# Patient Record
Sex: FEMALE | Race: WHITE | Employment: UNEMPLOYED | URBAN - NONMETROPOLITAN AREA
[De-identification: names, ages, dates, MRNs, and addresses within clinical notes are randomized per-mention and may not be internally consistent; named-entity substitution may affect disease eponyms.]

---

## 2015-09-11 LAB
CHOLESTEROL, TOTAL: 149 MG/DL
CHOLESTEROL/HDL RATIO: 2.3
GLUCOSE BLD-MCNC: 84 MG/DL
HDLC SERPL-MCNC: 64 MG/DL (ref 35–70)
LDL CHOLESTEROL CALCULATED: 72.2 MG/DL (ref 0–160)
TRIGL SERPL-MCNC: 64 MG/DL
VLDLC SERPL CALC-MCNC: 13 MG/DL

## 2017-05-18 VITALS
SYSTOLIC BLOOD PRESSURE: 132 MMHG | HEIGHT: 65 IN | DIASTOLIC BLOOD PRESSURE: 80 MMHG | TEMPERATURE: 98.5 F | HEART RATE: 74 BPM

## 2017-05-18 RX ORDER — AZITHROMYCIN 250 MG/1
250 TABLET, FILM COATED ORAL DAILY
COMMUNITY
End: 2017-06-02

## 2017-05-18 RX ORDER — PREDNISONE 20 MG/1
20 TABLET ORAL DAILY
COMMUNITY
End: 2017-06-02

## 2017-06-02 ENCOUNTER — OFFICE VISIT (OUTPATIENT)
Dept: FAMILY MEDICINE CLINIC | Age: 47
End: 2017-06-02
Payer: COMMERCIAL

## 2017-06-02 VITALS
HEIGHT: 65 IN | SYSTOLIC BLOOD PRESSURE: 130 MMHG | WEIGHT: 166 LBS | DIASTOLIC BLOOD PRESSURE: 82 MMHG | HEART RATE: 72 BPM | BODY MASS INDEX: 27.66 KG/M2

## 2017-06-02 DIAGNOSIS — O24.410 DIET CONTROLLED GESTATIONAL DIABETES MELLITUS (GDM) IN THIRD TRIMESTER: ICD-10-CM

## 2017-06-02 DIAGNOSIS — Z13.1 SCREENING FOR DIABETES MELLITUS: ICD-10-CM

## 2017-06-02 DIAGNOSIS — Z00.00 WELL ADULT EXAM: Primary | ICD-10-CM

## 2017-06-02 PROCEDURE — 99396 PREV VISIT EST AGE 40-64: CPT | Performed by: FAMILY MEDICINE

## 2017-06-02 ASSESSMENT — PATIENT HEALTH QUESTIONNAIRE - PHQ9
1. LITTLE INTEREST OR PLEASURE IN DOING THINGS: 0
SUM OF ALL RESPONSES TO PHQ QUESTIONS 1-9: 0
SUM OF ALL RESPONSES TO PHQ9 QUESTIONS 1 & 2: 0
2. FEELING DOWN, DEPRESSED OR HOPELESS: 0

## 2017-06-02 ASSESSMENT — ENCOUNTER SYMPTOMS: WHEEZING: 0

## 2017-06-08 LAB — PAP SMEAR: NORMAL

## 2018-02-09 ENCOUNTER — OFFICE VISIT (OUTPATIENT)
Dept: FAMILY MEDICINE CLINIC | Age: 48
End: 2018-02-09
Payer: COMMERCIAL

## 2018-02-09 VITALS
DIASTOLIC BLOOD PRESSURE: 84 MMHG | SYSTOLIC BLOOD PRESSURE: 134 MMHG | TEMPERATURE: 99.7 F | OXYGEN SATURATION: 97 % | HEART RATE: 95 BPM | WEIGHT: 172.7 LBS | BODY MASS INDEX: 28.74 KG/M2
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
## 2018-02-09 DIAGNOSIS — S30.0XXA SACRAL CONTUSION, INITIAL ENCOUNTER: ICD-10-CM

## 2018-02-09 DIAGNOSIS — J10.1 INFLUENZA A: Primary | ICD-10-CM
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
## 2018-02-09 LAB
INFLUENZA A ANTIBODY: POSITIVE
INFLUENZA B ANTIBODY: NEGATIVE

## 2018-02-09 PROCEDURE — G8484 FLU IMMUNIZE NO ADMIN: HCPCS | Performed by: FAMILY MEDICINE

## 2018-02-09 PROCEDURE — 1036F TOBACCO NON-USER: CPT | Performed by: FAMILY MEDICINE

## 2018-02-09 PROCEDURE — G8427 DOCREV CUR MEDS BY ELIG CLIN: HCPCS | Performed by: FAMILY MEDICINE

## 2018-02-09 PROCEDURE — 99214 OFFICE O/P EST MOD 30 MIN: CPT | Performed by: FAMILY MEDICINE

## 2018-02-09 PROCEDURE — 87804 INFLUENZA ASSAY W/OPTIC: CPT | Performed by: FAMILY MEDICINE

## 2018-02-09 PROCEDURE — G8419 CALC BMI OUT NRM PARAM NOF/U: HCPCS | Performed by: FAMILY MEDICINE

## 2018-02-09 ASSESSMENT — ENCOUNTER SYMPTOMS
RHINORRHEA: 1
SORE THROAT: 1
WHEEZING: 0
COUGH: 1

## 2018-02-09 NOTE — PROGRESS NOTES
1200 [facility]  [address]  Dept: [phone]  Dept Fax: [phone]    Diaz Hannah is a 49 y.o. female who presents today for her medical conditions/complaints as noted below. Diaz Hannah is c/o of Cough (stated symptoms started a week ago, hit really hard wednesday. Has taken ibuprofen pm and tylenol mucous and denies any complications but its not working anymore ); Congestion (cough, sneezing, fever started wednesday morning, headache, ear pain in both ears, denies any nasal drainage, feels like upper airway congestion, throat started hurting yesterda hurts to swallow); and Other (fell down the stairs on Monday tailbone really hurts, states has been down hill since feels like compa been hit by a truck.)      HPI:     Started not feeling well about 1 week ago- got chilled when she was outside but really went down hilll on Weds. Fro[place] down the stairs on her Tailbone. Slipped on water as she was washing the steps. Landed squarely on her tailbone. Hurts to sit. Pain in her right anterior thigh, no bruising. No other injury. No weakness, no numbness or tingling noted. Sons had similar sx but they were better in a day or 2. Cough   The current episode started in the past 7 days (ilness actually really started on weds). The problem occurs every few minutes. The cough is productive of sputum. Associated symptoms include chills, a fever, headaches, myalgias (really bad everywhere), nasal congestion, rhinorrhea (minimal), a sore throat and sweats. Pertinent negatives include no chest pain, postnasal drip or wheezing. The symptoms are aggravated by lying down. Other   Associated symptoms include chills, coughing, a fever, headaches, myalgias (really bad everywhere) and a sore throat. Pertinent negatives include no chest pain.        BP Readings from Last 3 Encounters:   02/09/18 134/84   06/02/17 130/82   06/27/16 132/80          (goal 120/80)    Wt Readings from Last 3 Encounters:   18 172 lb 11.2 oz (78.3 kg)   17 166 lb (75.3 kg)        Past Medical History:   Diagnosis Date    Asthma     Gestational diabetes       Past Surgical History:   Procedure Laterality Date     SECTION      x 3       Family History   Problem Relation Age of Onset    Arthritis Mother      spinal stenosis    Cirrhosis Father     Arthritis Father     Asthma Maternal Grandmother     Cancer Paternal Grandmother      stomach       Social History   Substance Use Topics    Smoking status: Never Smoker    Smokeless tobacco: Never Used    Alcohol use No      No current outpatient prescriptions on file. No current facility-administered medications for this visit. Allergies   Allergen Reactions    Ibuprofen      Hives, hand swelling    Penicillins     Tylenol [Acetaminophen]      Ear ringing       Health Maintenance   Topic Date Due    HIV screen  1984    DTaP/Tdap/Td vaccine (1 - Tdap) 1988    Flu vaccine (1) 2017    Cervical cancer screen  2020    Lipid screen  2020       Subjective:      Review of Systems   Constitutional: Positive for chills and fever. HENT: Positive for rhinorrhea (minimal) and sore throat. Negative for postnasal drip. Respiratory: Positive for cough. Negative for wheezing. Cardiovascular: Negative for chest pain. Musculoskeletal: Positive for myalgias (really bad everywhere). Neurological: Positive for headaches. Objective:     /84   Pulse 95   Temp 99.7 °F (37.6 °C)   Wt 172 lb 11.2 oz (78.3 kg)   SpO2 97%   BMI 28.74 kg/m²     Physical Exam   Constitutional: She is oriented to person, place, and time. She appears well-developed and well-nourished. No distress. HENT:   Head: Normocephalic and atraumatic.    Right Ear: Tympanic membrane and external ear normal.   Left Ear: Tympanic membrane and external ear normal.   Mouth/Throat: Oropharynx is clear

## 2018-02-12 ENCOUNTER — OFFICE VISIT (OUTPATIENT)
Dept: FAMILY MEDICINE CLINIC | Age: 48
End: 2018-02-12
Payer: COMMERCIAL

## 2018-02-12 VITALS
WEIGHT: 173.5 LBS | DIASTOLIC BLOOD PRESSURE: 80 MMHG | HEART RATE: 81 BPM | SYSTOLIC BLOOD PRESSURE: 120 MMHG | OXYGEN SATURATION: 98 % | TEMPERATURE: 96.5 F | BODY MASS INDEX: 28.87 KG/M2

## 2018-02-12 DIAGNOSIS — J10.1 INFLUENZA A: Primary | ICD-10-CM

## 2018-02-12 DIAGNOSIS — R05.9 COUGH: ICD-10-CM

## 2018-02-12 PROCEDURE — G8419 CALC BMI OUT NRM PARAM NOF/U: HCPCS | Performed by: FAMILY MEDICINE

## 2018-02-12 PROCEDURE — 99214 OFFICE O/P EST MOD 30 MIN: CPT | Performed by: FAMILY MEDICINE

## 2018-02-12 PROCEDURE — G8427 DOCREV CUR MEDS BY ELIG CLIN: HCPCS | Performed by: FAMILY MEDICINE

## 2018-02-12 PROCEDURE — 1036F TOBACCO NON-USER: CPT | Performed by: FAMILY MEDICINE

## 2018-02-12 PROCEDURE — G8484 FLU IMMUNIZE NO ADMIN: HCPCS | Performed by: FAMILY MEDICINE

## 2018-02-12 RX ORDER — BENZONATATE 200 MG/1
200 CAPSULE ORAL 3 TIMES DAILY PRN
Qty: 30 CAPSULE | Refills: 0 | Status: SHIPPED | OUTPATIENT
Start: 2018-02-12 | End: 2019-03-28 | Stop reason: ALTCHOICE

## 2018-02-12 ASSESSMENT — ENCOUNTER SYMPTOMS
COUGH: 1
SHORTNESS OF BREATH: 1

## 2018-02-12 NOTE — PROGRESS NOTES
x 3       Family History   Problem Relation Age of Onset    Arthritis Mother      spinal stenosis    Cirrhosis Father     Arthritis Father     Asthma Maternal Grandmother     Cancer Paternal Grandmother      stomach       Social History   Substance Use Topics    Smoking status: Never Smoker    Smokeless tobacco: Never Used    Alcohol use No      Current Outpatient Prescriptions   Medication Sig Dispense Refill    benzonatate (TESSALON) 200 MG capsule Take 1 capsule by mouth 3 times daily as needed for Cough 30 capsule 0     No current facility-administered medications for this visit. Allergies   Allergen Reactions    Ibuprofen      Hives, hand swelling    Penicillins     Tylenol [Acetaminophen]      Ear ringing       Health Maintenance   Topic Date Due    HIV screen  04/16/1984    DTaP/Tdap/Td vaccine (1 - Tdap) 04/16/1988    Flu vaccine (1) 09/01/2017    Cervical cancer screen  06/02/2020    Lipid screen  09/11/2020       Subjective:      Review of Systems   Constitutional: Positive for activity change, appetite change, chills, fatigue and fever (not the last day). Negative for unexpected weight change. Respiratory: Positive for cough and shortness of breath. Cardiovascular: Negative for chest pain, palpitations and leg swelling. Objective:     /80   Pulse 81   Temp 96.5 °F (35.8 °C)   Wt 173 lb 8 oz (78.7 kg)   SpO2 98%   BMI 28.87 kg/m²     Physical Exam   Constitutional: She is oriented to person, place, and time. She appears well-developed and well-nourished. No distress. HENT:   Head: Normocephalic and atraumatic. Right Ear: Tympanic membrane and external ear normal.   Left Ear: Tympanic membrane and external ear normal.   Mouth/Throat: Oropharynx is clear and moist. No oropharyngeal exudate. Eyes: Conjunctivae and EOM are normal. Pupils are equal, round, and reactive to light. Neck: Normal range of motion. No thyromegaly present.    Cardiovascular: Normal

## 2019-01-30 ENCOUNTER — OFFICE VISIT (OUTPATIENT)
Dept: FAMILY MEDICINE CLINIC | Age: 49
End: 2019-01-30
Payer: COMMERCIAL

## 2019-01-30 VITALS
SYSTOLIC BLOOD PRESSURE: 120 MMHG | RESPIRATION RATE: 16 BRPM | DIASTOLIC BLOOD PRESSURE: 80 MMHG | HEIGHT: 65 IN | HEART RATE: 93 BPM | BODY MASS INDEX: 28.52 KG/M2 | OXYGEN SATURATION: 97 % | TEMPERATURE: 98.2 F | WEIGHT: 171.2 LBS

## 2019-01-30 DIAGNOSIS — M79.602 LEFT ARM PAIN: ICD-10-CM

## 2019-01-30 DIAGNOSIS — M54.2 NECK PAIN ON LEFT SIDE: Primary | ICD-10-CM

## 2019-01-30 PROCEDURE — G8427 DOCREV CUR MEDS BY ELIG CLIN: HCPCS | Performed by: NURSE PRACTITIONER

## 2019-01-30 PROCEDURE — G8484 FLU IMMUNIZE NO ADMIN: HCPCS | Performed by: NURSE PRACTITIONER

## 2019-01-30 PROCEDURE — 1036F TOBACCO NON-USER: CPT | Performed by: NURSE PRACTITIONER

## 2019-01-30 PROCEDURE — G8419 CALC BMI OUT NRM PARAM NOF/U: HCPCS | Performed by: NURSE PRACTITIONER

## 2019-01-30 PROCEDURE — 99213 OFFICE O/P EST LOW 20 MIN: CPT | Performed by: NURSE PRACTITIONER

## 2019-01-30 RX ORDER — CYCLOBENZAPRINE HCL 10 MG
10 TABLET ORAL 3 TIMES DAILY PRN
Qty: 30 TABLET | Refills: 0 | Status: SHIPPED | OUTPATIENT
Start: 2019-01-30 | End: 2019-02-09

## 2019-01-30 RX ORDER — PREDNISONE 20 MG/1
20 TABLET ORAL 2 TIMES DAILY
Qty: 10 TABLET | Refills: 0 | Status: SHIPPED | OUTPATIENT
Start: 2019-01-30 | End: 2019-02-04

## 2019-01-30 ASSESSMENT — ENCOUNTER SYMPTOMS
GASTROINTESTINAL NEGATIVE: 1
ALLERGIC/IMMUNOLOGIC NEGATIVE: 1
RESPIRATORY NEGATIVE: 1
CHEST TIGHTNESS: 0
COUGH: 0
SHORTNESS OF BREATH: 0
EYES NEGATIVE: 1

## 2019-03-28 ENCOUNTER — OFFICE VISIT (OUTPATIENT)
Dept: FAMILY MEDICINE CLINIC | Age: 49
End: 2019-03-28
Payer: COMMERCIAL

## 2019-03-28 VITALS
OXYGEN SATURATION: 98 % | BODY MASS INDEX: 28.9 KG/M2 | DIASTOLIC BLOOD PRESSURE: 70 MMHG | SYSTOLIC BLOOD PRESSURE: 118 MMHG | WEIGHT: 175 LBS | HEART RATE: 76 BPM

## 2019-03-28 DIAGNOSIS — M75.82 ROTATOR CUFF TENDONITIS, LEFT: Primary | ICD-10-CM

## 2019-03-28 PROCEDURE — 1036F TOBACCO NON-USER: CPT | Performed by: FAMILY MEDICINE

## 2019-03-28 PROCEDURE — G8427 DOCREV CUR MEDS BY ELIG CLIN: HCPCS | Performed by: FAMILY MEDICINE

## 2019-03-28 PROCEDURE — G8484 FLU IMMUNIZE NO ADMIN: HCPCS | Performed by: FAMILY MEDICINE

## 2019-03-28 PROCEDURE — 99213 OFFICE O/P EST LOW 20 MIN: CPT | Performed by: FAMILY MEDICINE

## 2019-03-28 PROCEDURE — G8419 CALC BMI OUT NRM PARAM NOF/U: HCPCS | Performed by: FAMILY MEDICINE

## 2019-03-28 RX ORDER — PREDNISONE 20 MG/1
20 TABLET ORAL DAILY
Qty: 7 TABLET | Refills: 0 | Status: SHIPPED | OUTPATIENT
Start: 2019-03-28 | End: 2019-04-04

## 2019-03-28 ASSESSMENT — PATIENT HEALTH QUESTIONNAIRE - PHQ9
1. LITTLE INTEREST OR PLEASURE IN DOING THINGS: 0
SUM OF ALL RESPONSES TO PHQ9 QUESTIONS 1 & 2: 1
SUM OF ALL RESPONSES TO PHQ QUESTIONS 1-9: 1
2. FEELING DOWN, DEPRESSED OR HOPELESS: 1
SUM OF ALL RESPONSES TO PHQ QUESTIONS 1-9: 1

## 2019-03-28 NOTE — PATIENT INSTRUCTIONS
Patient Education        Rotator Cuff Injury: Care Instructions  Your Care Instructions  The rotator cuff is a group of tendons and muscles around the shoulder that keeps the upper arm bone in place. It keeps the shoulder joint stable and allows you to raise and rotate your arm. Damage to the rotator cuff can be caused by overuse, a fall, or a direct blow to the shoulder area, which can tear the tendons. Over time, everyday wear can damage the tendons and make injury more likely. Treatment can depend upon the amount of damage to the tendons. In a younger person, surgery may be the first choice. But if you are older than 25, you likely have some wear on your rotator cuff. Surgery may not be the most effective treatment. Your doctor may have you try physical therapy and exercise first.  Follow-up care is a key part of your treatment and safety. Be sure to make and go to all appointments, and call your doctor if you are having problems. It's also a good idea to know your test results and keep a list of the medicines you take. How can you care for yourself at home? · Rest your shoulder as much as you can. If your doctor put your arm in a sling or shoulder immobilizer, wear it as directed. Do not take it off before your doctor tells you to. If it is too tight, loosen it. · Be safe with medicines. Read and follow all instructions on the label. ? If the doctor gave you a prescription medicine for pain, take it as prescribed. ? If you are not taking a prescription pain medicine, ask your doctor if you can take an over-the-counter medicine. · Put ice or a cold pack on your shoulder for 10 to 20 minutes at a time. Try to do this every 1 to 2 hours for the next 3 days (when you are awake). Put a thin cloth between the ice pack and your skin. · After 3 days, put a warm, wet towel on your shoulder. This is to relax the muscles and help blood flow.   · While holding a warm, wet towel on your shoulder, lean forward so and anti-inflammatory medicine to reduce pain and swelling. If you still have pain after trying these treatments, you and your doctor can discuss having a steroid injection or surgery. Follow-up care is a key part of your treatment and safety. Be sure to make and go to all appointments, and call your doctor if you are having problems. It's also a good idea to know your test results and keep a list of the medicines you take. How can you care for yourself at home? · Be safe with medicines. Read and follow all instructions on the label. ? If the doctor gave you a prescription medicine for pain, take it as prescribed. ? If you are not taking a prescription pain medicine, ask your doctor if you can take an over-the-counter medicine. · Put ice or a cold pack on your shoulder for 10 to 20 minutes at a time. Try to do this every 1 to 2 hours for the next 3 days (when you are awake). Put a thin cloth between the ice pack and your skin. · After 3 days, put a warm, wet towel on your shoulder. This is to relax the muscles and increase blood flow. While holding the towel on your shoulder, lean forward so your arm hangs freely, and gently swing your arm back and forth like a pendulum. You also can do this standing under a warm shower. · Follow your doctor's advice for physical therapy. When your doctor says it is okay, try these stretching exercises. Do them slowly to avoid injury. Put a warm, wet towel on your shoulder before exercising. Stop any exercise that increases pain. ? Range-of-motion exercises. If it is not too painful, stretch your arm in four directions: across the body, up the back, to the side, and overhead. ? Pendulum exercise. Lean forward and hold onto a table or the back of a chair with your good arm. Bend at the waist, letting the arm with the sore shoulder hang straight down. Swing your arm back and forth like a pendulum, then in circles that start small and slowly grow larger.  This exercise does not use the arm muscles. Instead, use your legs and your hips to create movement that makes your arm swing freely. Try this for about 5 minutes, several times a day. ? Wall climbing (to the side). Stand with your side to a wall so that your fingers can just touch it. Then turn so your body is turned slightly toward the wall. Walk the fingers of your injured arm up the wall as high as pain permits. Try not to shrug your shoulder up toward your ear as you move your arm up. Hold that position for a count of 15 to 30 seconds. Walk your fingers down to the starting position. Repeat 2 to 4 times, trying to reach higher each time. ? Wall climbing (to the front). Face a wall, standing so your fingers can just touch it. Walk the fingers of your affected arm up the wall as high as pain permits. Try not to shrug your shoulder up toward your ear as you move your arm up. Hold that position for a count of 15 to 30 seconds. Slowly walk your fingers to the starting position. Repeat 2 to 4 times, trying to reach higher each time. · Rest your shoulder when you are not doing stretches and other exercises. Your doctor may tell you to wait for the pain to go away before doing exercises. Do not lift heavy bags of groceries, play sports, or do anything else that makes you twist or stress your shoulder. Avoid activities where you move your affected arm above your head. When should you call for help? Call your doctor now or seek immediate medical care if:    · You have severe pain.     · You cannot move your shoulder or arm.     · You have tingling or numbness in your arm or hand.     · Your arm or hand is cool or pale.    Watch closely for changes in your health, and be sure to contact your doctor if:    · Your pain gets worse.     · You have new or worse swelling in your arm or hand.     · You do not get better as expected. Where can you learn more? Go to [email or web address] org and sign in to your [employer] account. Enter E207 in the [facility] box to learn more about \"Rotator Cuff Problems: Care Instructions. \"     If you do not have an account, please click on the \"Sign Up Now\" link. Current as of: September 20, 2018  Content Version: 11.9  © 5627-4635 [employer]. Care instructions adapted under license by [facility] ([facility]). If you have questions about a medical condition or this instruction, always ask your healthcare professional. [address] any warranty or liability for your use of this information.

## 2019-03-28 NOTE — PROGRESS NOTES
spinal stenosis    Cirrhosis Father     Arthritis Father     Asthma Maternal Grandmother     Cancer Paternal Grandmother         stomach       Social History     Tobacco Use    Smoking status: Never Smoker    Smokeless tobacco: Never Used   Substance Use Topics    Alcohol use: No      No current outpatient medications on file. No current facility-administered medications for this visit. Allergies   Allergen Reactions    Ibuprofen      Hives, hand swelling    Penicillins     Tylenol [Acetaminophen]      Ear ringing       Health Maintenance   Topic Date Due    HIV screen  04/16/1984    DTaP/Tdap/Td vaccine (1 - Tdap) 04/16/1988    Diabetes screen  04/16/2009    Flu vaccine (Season Ended) 09/01/2019    Cervical cancer screen  06/02/2020    Lipid screen  09/11/2020       Subjective:      Review of Systems   Neurological: Positive for numbness (in the tips of her fingers if she sleeps with her arm flexed). Objective:     /70 (Site: Right Upper Arm, Position: Sitting, Cuff Size: Medium Adult)   Pulse 76   Wt 175 lb (79.4 kg)   SpO2 98%   BMI 28.90 kg/m²     Physical Exam   Constitutional: She is oriented to person, place, and time. She appears well-developed and well-nourished. Cardiovascular: Normal rate. Pulmonary/Chest: Effort normal.   Musculoskeletal: Normal range of motion. She exhibits no edema. Left shoulder has full ROM but has tenderness on the acroium, tender on the deltoid insertion, tender on the lateral scapula. Positive empty can and more so the drop arm, neg Neer, no pain with ext rotation but pain with internal rotation   Neurological: She is alert and oriented to person, place, and time. Nursing note and vitals reviewed. Assessment/Plan:      Diagnosis Orders   1. Rotator cuff tendonitis, left       Suspect acute strain of the rotator cuff. Discussed avoiding activities that over work. Ice, gently ROM reviewed.   If not improving then would consider referral for physical therapy    Lab Results   Component Value Date    CHOL 149 09/11/2015    TRIG 64 09/11/2015    HDL 64 09/11/2015       Return if symptoms worsen or fail to improve. Patient given educational materials - see patientinstructions. Discussed use, benefit, and side effects of prescribed medications. All patient questions answered. Pt voiced understanding. Reviewed health maintenance. Instructed to continue current medications, diet andexercise. Patient agreed with treatment plan. Follow up as directed.      Electronically signed by Safia Malcolm MD on 4/7/2019

## 2021-01-12 ENCOUNTER — OFFICE VISIT (OUTPATIENT)
Dept: FAMILY MEDICINE CLINIC | Age: 51
End: 2021-01-12
Payer: COMMERCIAL

## 2021-01-12 VITALS
WEIGHT: 185.8 LBS | OXYGEN SATURATION: 99 % | BODY MASS INDEX: 30.68 KG/M2 | SYSTOLIC BLOOD PRESSURE: 134 MMHG | HEART RATE: 83 BPM | DIASTOLIC BLOOD PRESSURE: 86 MMHG

## 2021-01-12 DIAGNOSIS — M25.511 CHRONIC PAIN IN RIGHT SHOULDER: Primary | ICD-10-CM

## 2021-01-12 DIAGNOSIS — G89.29 CHRONIC PAIN IN RIGHT SHOULDER: Primary | ICD-10-CM

## 2021-01-12 PROCEDURE — G8484 FLU IMMUNIZE NO ADMIN: HCPCS | Performed by: NURSE PRACTITIONER

## 2021-01-12 PROCEDURE — G8427 DOCREV CUR MEDS BY ELIG CLIN: HCPCS | Performed by: NURSE PRACTITIONER

## 2021-01-12 PROCEDURE — 3017F COLORECTAL CA SCREEN DOC REV: CPT | Performed by: NURSE PRACTITIONER

## 2021-01-12 PROCEDURE — 99213 OFFICE O/P EST LOW 20 MIN: CPT | Performed by: NURSE PRACTITIONER

## 2021-01-12 PROCEDURE — G8417 CALC BMI ABV UP PARAM F/U: HCPCS | Performed by: NURSE PRACTITIONER

## 2021-01-12 PROCEDURE — 1036F TOBACCO NON-USER: CPT | Performed by: NURSE PRACTITIONER

## 2021-01-12 RX ORDER — MELOXICAM 7.5 MG/1
7.5 TABLET ORAL DAILY PRN
Qty: 30 TABLET | Refills: 0 | Status: SHIPPED | OUTPATIENT
Start: 2021-01-12 | End: 2021-01-20 | Stop reason: SDUPTHER

## 2021-01-12 NOTE — PROGRESS NOTES
1200 [facility]  [address] [facility], [address] [facility]  Dept: [phone]  Dept Fax: [phone]    Feliberto Scheuermann is a 45 y.o. female who presents today for her medical conditions/complaints as noted below. Feliberto Scheuermann c/o of Arthritis (c/o upper arm, shoulder pain, has been ongoing for years, started PT today was second visit but says pain in too much, was only at night but now during day, xray showed arthritis, has tried alleve, was given an antiflammatory, tylenol did not help)      HPI:   Right shoulder pain/arm for 5 years, only notices in the winter months. Started 10/2020, worsening over the past 2-3 weeks. She had recent xray of her neck and states she was told it is arthritis. She went to her first physical therapy appointment last Friday and states her pain increased after this. Pain is rated as \"unbearable\". It keeps her awake at night. She has tried Tylenol, \"something else from [place]", and Tylenol PM. Nothing seems to be working. She had a follow up PT visit today and states she does not want to go back. Shoulder Pain   The pain is present in the right shoulder. This is a chronic problem. There has been no history of extremity trauma. The problem occurs daily. The problem has been gradually worsening. The quality of the pain is described as aching. The pain is moderate. Associated symptoms include a limited range of motion. Pertinent negatives include no fever, inability to bear weight, itching, joint locking, joint swelling, numbness or tingling. The symptoms are aggravated by activity. She has tried rest, acetaminophen and NSAIDS for the symptoms. The treatment provided mild relief.        BP Readings from Last 3 Encounters:   01/12/21 134/86   03/28/19 118/70   01/30/19 120/80              ([id number])    Pulse Readings from Last 3 Encounters:   01/12/21 83   03/28/19 76   01/30/19 93        Wt Readings from Last 3 Encounters: 21 185 lb 12.8 oz (84.3 kg)   19 175 lb (79.4 kg)   19 171 lb 3.2 oz (77.7 kg)       Past Medical History:   Diagnosis Date    Asthma     Gestational diabetes       Past Surgical History:   Procedure Laterality Date     SECTION      x 3       Family History   Problem Relation Age of Onset    Arthritis Mother         spinal stenosis    Cirrhosis Father     Arthritis Father     Asthma Maternal Grandmother     Cancer Paternal Grandmother         stomach       Social History     Tobacco Use    Smoking status: Never Smoker    Smokeless tobacco: Never Used   Substance Use Topics    Alcohol use: No      Current Outpatient Medications   Medication Sig Dispense Refill    meloxicam (MOBIC) 7.5 MG tablet Take 1 tablet by mouth daily as needed for Pain 30 tablet 0     No current facility-administered medications for this visit. Allergies   Allergen Reactions    Ibuprofen      Hives, hand swelling    Penicillins     Tylenol [Acetaminophen]      Ear ringing       Health Maintenance   Topic Date Due    Hepatitis C screen  1969    HIV screen  1984    DTaP/Tdap/Td vaccine (1 - Tdap) 1988    Breast cancer screen  2019    Shingles Vaccine (1 of 2) 2019    Colon cancer screen colonoscopy  2019    Cervical cancer screen  2020    Flu vaccine (1) 2020    Lipid screen  2020    Hepatitis A vaccine  Aged Out    Hepatitis B vaccine  Aged Out    Hib vaccine  Aged Out    Meningococcal (ACWY) vaccine  Aged Out    Pneumococcal 0-64 years Vaccine  Aged Out       Subjective:      Review of Systems   Constitutional: Negative for appetite change, chills, fatigue and fever. HENT: Negative. Cardiovascular: Negative for chest pain. Musculoskeletal: Positive for arthralgias (right shoulder). Negative for neck pain. Skin: Negative for itching. Neurological: Negative for tingling and numbness.        Objective: /86   Pulse 83   Wt 185 lb 12.8 oz (84.3 kg)   SpO2 99%   BMI 30.68 kg/m²     Physical Exam  Constitutional:       Appearance: Normal appearance. HENT:      Head: Normocephalic. Eyes:      Conjunctiva/sclera: Conjunctivae normal.   Neck:      Musculoskeletal: Neck supple. Cardiovascular:      Rate and Rhythm: Normal rate and regular rhythm. Heart sounds: Normal heart sounds. Pulmonary:      Effort: Pulmonary effort is normal.      Breath sounds: Normal breath sounds. No wheezing. Musculoskeletal:      Left shoulder: She exhibits decreased range of motion and tenderness. Neurological:      Mental Status: She is alert and oriented to person, place, and time. Right Shoulder Exam     Tenderness   The patient is experiencing tenderness in the acromioclavicular joint. Range of Motion   Active abduction: abnormal   Extension: abnormal   Forward flexion: abnormal   Internal rotation 0 degrees: abnormal     Tests   Apprehension: negative  Impingement: positive            PHQ Scores 3/28/2019 6/2/2017   PHQ2 Score 1 0   PHQ9 Score 1 0     Interpretation of Total Score Depression Severity: 1-4 = Minimal depression, 5-9 = Mild depression, 10-14 = Moderate depression, 15-19 = Moderately severe depression, 20-27 = Severe depression     Lab Results   Component Value Date    GLUCOSE 84 09/11/2015     No results found for: LABA1C    Lab Results   Component Value Date    LDLCALC 72.2 09/11/2015       Assessment:     1.  Chronic pain in right shoulder        Plan:     Orders Placed This Encounter   Procedures    XR SHOULDER RIGHT (MIN 2 VIEWS)       Orders Placed This Encounter   Medications    meloxicam (MOBIC) 7.5 MG tablet     Sig: Take 1 tablet by mouth daily as needed for Pain     Dispense:  30 tablet     Refill:  0 Start meloxicam daily with a meal.  Do not take any other NSAIDs with this medication. Discussed use, benefit, and side effects of prescribed medications. All patient questions answered. Patient voiced understanding. Instructed to continue current medications. Patient agreed with treatment plan. Follow up with Dr. Cele Diamond if not improving. Return if symptoms worsen or fail to improve.     Electronically signed by ARIANNA Lozano CNP on 1/17/2021

## 2021-01-20 ENCOUNTER — HOSPITAL ENCOUNTER (OUTPATIENT)
Age: 51
Setting detail: SPECIMEN
Discharge: HOME | End: 2021-01-20
Payer: COMMERCIAL

## 2021-01-20 ENCOUNTER — OFFICE VISIT (OUTPATIENT)
Dept: FAMILY MEDICINE CLINIC | Age: 51
End: 2021-01-20
Payer: COMMERCIAL

## 2021-01-20 VITALS
BODY MASS INDEX: 31.49 KG/M2 | DIASTOLIC BLOOD PRESSURE: 78 MMHG | SYSTOLIC BLOOD PRESSURE: 138 MMHG | HEART RATE: 67 BPM | HEIGHT: 65 IN | OXYGEN SATURATION: 98 % | WEIGHT: 189 LBS

## 2021-01-20 DIAGNOSIS — Z13.1 ENCOUNTER FOR SCREENING EXAMINATION FOR IMPAIRED GLUCOSE REGULATION AND DIABETES MELLITUS: ICD-10-CM

## 2021-01-20 DIAGNOSIS — G89.29 CHRONIC PAIN IN RIGHT SHOULDER: Primary | ICD-10-CM

## 2021-01-20 DIAGNOSIS — Z13.220 SCREENING, LIPID: ICD-10-CM

## 2021-01-20 DIAGNOSIS — R63.5 WEIGHT GAIN: ICD-10-CM

## 2021-01-20 DIAGNOSIS — Z86.32 HISTORY OF GESTATIONAL DIABETES: ICD-10-CM

## 2021-01-20 DIAGNOSIS — M25.511 CHRONIC PAIN IN RIGHT SHOULDER: Primary | ICD-10-CM

## 2021-01-20 DIAGNOSIS — Z71.89 ADVICE GIVEN ABOUT COVID-19 VIRUS INFECTION: ICD-10-CM

## 2021-01-20 LAB — GLUCOSE FASTING: 98 MG/DL (ref 70–99)

## 2021-01-20 PROCEDURE — 99214 OFFICE O/P EST MOD 30 MIN: CPT | Performed by: FAMILY MEDICINE

## 2021-01-20 PROCEDURE — G8484 FLU IMMUNIZE NO ADMIN: HCPCS | Performed by: FAMILY MEDICINE

## 2021-01-20 PROCEDURE — 82947 ASSAY GLUCOSE BLOOD QUANT: CPT

## 2021-01-20 PROCEDURE — G8417 CALC BMI ABV UP PARAM F/U: HCPCS | Performed by: FAMILY MEDICINE

## 2021-01-20 PROCEDURE — 36415 COLL VENOUS BLD VENIPUNCTURE: CPT

## 2021-01-20 PROCEDURE — G8427 DOCREV CUR MEDS BY ELIG CLIN: HCPCS | Performed by: FAMILY MEDICINE

## 2021-01-20 PROCEDURE — 80061 LIPID PANEL: CPT

## 2021-01-20 PROCEDURE — 1036F TOBACCO NON-USER: CPT | Performed by: FAMILY MEDICINE

## 2021-01-20 PROCEDURE — 3017F COLORECTAL CA SCREEN DOC REV: CPT | Performed by: FAMILY MEDICINE

## 2021-01-20 RX ORDER — ACETAMINOPHEN 500 MG
500 TABLET ORAL EVERY 6 HOURS PRN
COMMUNITY

## 2021-01-20 RX ORDER — MELOXICAM 7.5 MG/1
7.5 TABLET ORAL DAILY PRN
Qty: 30 TABLET | Refills: 3 | Status: SHIPPED | OUTPATIENT
Start: 2021-01-20

## 2021-01-20 SDOH — ECONOMIC STABILITY: INCOME INSECURITY: HOW HARD IS IT FOR YOU TO PAY FOR THE VERY BASICS LIKE FOOD, HOUSING, MEDICAL CARE, AND HEATING?: NOT ASKED

## 2021-01-20 SDOH — ECONOMIC STABILITY: FOOD INSECURITY: WITHIN THE PAST 12 MONTHS, THE FOOD YOU BOUGHT JUST DIDN'T LAST AND YOU DIDN'T HAVE MONEY TO GET MORE.: NEVER TRUE

## 2021-01-20 SDOH — ECONOMIC STABILITY: TRANSPORTATION INSECURITY
IN THE PAST 12 MONTHS, HAS THE LACK OF TRANSPORTATION KEPT YOU FROM MEDICAL APPOINTMENTS OR FROM GETTING MEDICATIONS?: NO

## 2021-01-20 ASSESSMENT — PATIENT HEALTH QUESTIONNAIRE - PHQ9
SUM OF ALL RESPONSES TO PHQ QUESTIONS 1-9: 0
SUM OF ALL RESPONSES TO PHQ QUESTIONS 1-9: 0
1. LITTLE INTEREST OR PLEASURE IN DOING THINGS: 0

## 2021-01-20 NOTE — PROGRESS NOTES
[facility]  [address]  Dept: [phone]  Dept Z:[phone]    Ori Ellington is a 45 y.o. female who presents today for her medical conditions/complaints as notedbelow. Ori Ellington is c/o of Arthritis (follow up shoulder pain- meloxicam is helping )      HPI:     RAVI    Has had the right shoulder pain chronically for about 5 years. Usually is worst in the winter. Earlier in December was not even able to lat on the right side. Did go to physical therapy for her neck and mild arthritis. Also had increased pain in her right shoulder and tried the prednisone which did not help much but the shoulder. Did start on the meloxicam and this does help but took a while to kick in. Now feels much better overall, only a bit achey in th morning and then will be fine    Worried about her weight gain. Prior to Covid she did very well and actually get down to about 140 pounds watching her carbohydrates. She is continue to stay very physically active and did not think she changed her diet since that time but her weight has come back on. She is not really watching her calories at all and in the past when she did watch allergies she was taking to an 1800 kcal diet    Also has questions about the COVID-19 vaccine and she is a bit concerned about this.     BP Readings from Last 3 Encounters:   21 138/78   21 134/86   19 118/70          (goal 120/80)    Wt Readings from Last 3 Encounters:   21 189 lb (85.7 kg)   21 185 lb 12.8 oz (84.3 kg)   19 175 lb (79.4 kg)        Past Medical History:   Diagnosis Date    Asthma     Gestational diabetes       Past Surgical History:   Procedure Laterality Date     SECTION      x 3       Family History   Problem Relation Age of Onset    Arthritis Mother         spinal stenosis    Cirrhosis Father     Arthritis Father     Asthma Maternal Grandmother  Cancer Paternal Grandmother         stomach       Social History     Tobacco Use    Smoking status: Never Smoker    Smokeless tobacco: Never Used   Substance Use Topics    Alcohol use: No      Current Outpatient Medications   Medication Sig Dispense Refill    acetaminophen (TYLENOL) 500 MG tablet Take 500 mg by mouth every 6 hours as needed for Pain      meloxicam (MOBIC) 7.5 MG tablet Take 1 tablet by mouth daily as needed for Pain 30 tablet 3     No current facility-administered medications for this visit. Allergies   Allergen Reactions    Ibuprofen      Hives, hand swelling    Penicillins     Tylenol [Acetaminophen]      Ear ringing       Health Maintenance   Topic Date Due    Hepatitis C screen  1970    HIV screen  04/16/1984    DTaP/Tdap/Td vaccine (1 - Tdap) 04/16/1988    Diabetes screen  04/16/2009    Breast cancer screen  04/16/2019    Shingles Vaccine (1 of 2) 04/16/2019    Colon cancer screen colonoscopy  04/16/2019    Cervical cancer screen  06/02/2020    Flu vaccine (1) 09/01/2020    Lipid screen  09/11/2020    Hepatitis A vaccine  Aged Out    Hepatitis B vaccine  Aged Out    Hib vaccine  Aged Out    Meningococcal (ACWY) vaccine  Aged Out    Pneumococcal 0-64 years Vaccine  Aged Out       Subjective:      Review of Systems    Objective:     /78 (Site: Left Upper Arm, Position: Sitting, Cuff Size: Large Adult)   Pulse 67   Ht 5' 5.25\" (1.657 m)   Wt 189 lb (85.7 kg)   SpO2 98%   BMI 31.21 kg/m²     Physical Exam  Vitals signs and nursing note reviewed. Constitutional:       Appearance: Normal appearance. Neck:      Musculoskeletal: Neck supple. No muscular tenderness. Cardiovascular:      Rate and Rhythm: Normal rate.    Pulmonary:      Effort: Pulmonary effort is normal.   Musculoskeletal:        Arms: Comments: Crossover is negative mild tenderness in the anterior bicipital groove full range of motion no weakness or altered sensation. Rotator testing negative   Lymphadenopathy:      Cervical: No cervical adenopathy. Neurological:      Mental Status: She is alert. Psychiatric:         Mood and Affect: Mood normal.         Behavior: Behavior normal.         Thought Content: Thought content normal.         Judgment: Judgment normal.         Assessment/Plan:      Diagnosis Orders   1. Chronic pain in right shoulder  meloxicam (MOBIC) 7.5 MG tablet   2. Screening, lipid  Lipid Panel   3. Encounter for screening examination for impaired glucose regulation and diabetes mellitus  Glucose, Fasting   4. History of gestational diabetes     5. Weight gain     6. Advice given about COVID-19 virus infection       Would continue with the meloxicam daily for about another week to 10 days at that time switch to as needed and try the topical NSAID on a more regular basis to try to avoid long-term adverse effects from the NSAID usage. Voltaren gel recommended and this was demonstrated to her. If her symptoms increase could consider a trial of physical therapy and referral to orthopedics for possible steroid injection or evaluation for possible arthroscopic intervention. Discussed continuing to stay very physically active and continuing to eat a relatively lower carb diet but also starting to watch the caloric intake. Recommend 12 to 1500 kcal at the most for weight loss and no more than 1500 kcal regularly to keep the weight off. Discussed continued mask wearing social distancing to avoid Covid infection and did advise the COVID-19 vaccine when available for her age group to prevent if possible infection. This does not eliminate the need for the Covid precautions but does hopefully decrease her chances of ash the illness.       Lab Results   Component Value Date    CHOL 149 09/11/2015 TRIG 64 09/11/2015    HDL 64 09/11/2015       Return if symptoms worsen or fail to improve. Patient given educational materials - see patientinstructions. Discussed use, benefit, and side effects of prescribed medications. All patient questions answered. Pt voiced understanding. Reviewed health maintenance. Instructed to continue current medications, diet andexercise. Patient agreed with treatment plan. Follow up as directed.      (Please note that portions of this note were completed with a voice-recognition program. Efforts were made to edit the dictation but occasionally words are mis-transcribed.)    Electronically signed by Sofía Jalloh MD on 1/20/2021

## 2021-01-21 LAB
CHOLESTEROL/HDL RATIO: 2.9
CHOLESTEROL: 177 MG/DL
HDLC SERPL-MCNC: 62 MG/DL
LDL CHOLESTEROL: 96 MG/DL (ref 0–130)
TRIGL SERPL-MCNC: 96 MG/DL
VLDLC SERPL CALC-MCNC: NORMAL MG/DL (ref 1–30)

## 2022-02-25 LAB — GYNECOLOGY CYTOLOGY REPORT: NORMAL

## 2022-03-02 NOTE — RESULT ENCOUNTER NOTE
Mammogram results WNL. I called [address] to route to the correct provider.  SAIRA/BRENDA

## 2025-04-10 ENCOUNTER — TELEPHONE (OUTPATIENT)
Dept: FAMILY MEDICINE CLINIC | Age: 55
End: 2025-04-10

## 2025-04-10 NOTE — TELEPHONE ENCOUNTER
Pt will need to see PCP to get a referral before appt can be scheduled. Dr Chen's staff will contact pt.

## 2025-04-10 NOTE — TELEPHONE ENCOUNTER
----- Message from tyron JHA sent at 4/10/2025  9:01 AM EDT -----  Regarding: [facility] Appointment Request  [facility] Appointment Request    Patient needs appointment for [facility] Appointment Type: New to Provider.    Patient Requested Dates(s):any dates after 04/16/2025  Patient Requested Time:any time  preferably morning   Provider Name:ARIANNA Springer-CHRIS    Reason for Appointment Request: New Patient - Requested Provider unavailable  --------------------------------------------------------------------------------------------------------------------------    Relationship to Patient: Self     Call Back Information: OK to leave message on voicemail  Preferred Call Back Number: Phone [phone]

## 2025-04-16 ENCOUNTER — OFFICE VISIT (OUTPATIENT)
Dept: FAMILY MEDICINE CLINIC | Age: 55
End: 2025-04-16
Payer: COMMERCIAL

## 2025-04-16 VITALS
BODY MASS INDEX: 23.82 KG/M2 | WEIGHT: 143 LBS | DIASTOLIC BLOOD PRESSURE: 80 MMHG | SYSTOLIC BLOOD PRESSURE: 138 MMHG | HEART RATE: 90 BPM | OXYGEN SATURATION: 98 % | HEIGHT: 65 IN

## 2025-04-16 DIAGNOSIS — M54.13 RADICULOPATHY OF CERVICOTHORACIC REGION: ICD-10-CM

## 2025-04-16 DIAGNOSIS — M54.2 NECK PAIN ON LEFT SIDE: ICD-10-CM

## 2025-04-16 DIAGNOSIS — F41.9 ANXIETY: ICD-10-CM

## 2025-04-16 DIAGNOSIS — Z00.00 WELL ADULT EXAM: Primary | ICD-10-CM

## 2025-04-16 DIAGNOSIS — Z86.32 HISTORY OF GESTATIONAL DIABETES: ICD-10-CM

## 2025-04-16 PROCEDURE — 99386 PREV VISIT NEW AGE 40-64: CPT | Performed by: FAMILY MEDICINE

## 2025-04-16 RX ORDER — DULOXETIN HYDROCHLORIDE 60 MG/1
60 CAPSULE, DELAYED RELEASE ORAL DAILY
COMMUNITY

## 2025-04-16 RX ORDER — TOPIRAMATE SPINKLE 25 MG/1
25 CAPSULE ORAL DAILY
COMMUNITY

## 2025-04-16 RX ORDER — PHENTERMINE HYDROCHLORIDE 8 MG/1
8 TABLET ORAL DAILY
COMMUNITY

## 2025-04-16 SDOH — ECONOMIC STABILITY: FOOD INSECURITY: WITHIN THE PAST 12 MONTHS, YOU WORRIED THAT YOUR FOOD WOULD RUN OUT BEFORE YOU GOT MONEY TO BUY MORE.: NEVER TRUE

## 2025-04-16 SDOH — ECONOMIC STABILITY: FOOD INSECURITY: WITHIN THE PAST 12 MONTHS, THE FOOD YOU BOUGHT JUST DIDN'T LAST AND YOU DIDN'T HAVE MONEY TO GET MORE.: NEVER TRUE

## 2025-04-16 ASSESSMENT — PATIENT HEALTH QUESTIONNAIRE - PHQ9
SUM OF ALL RESPONSES TO PHQ QUESTIONS 1-9: 0
1. LITTLE INTEREST OR PLEASURE IN DOING THINGS: NOT AT ALL
2. FEELING DOWN, DEPRESSED OR HOPELESS: NOT AT ALL
SUM OF ALL RESPONSES TO PHQ QUESTIONS 1-9: 0

## 2025-04-16 NOTE — PROGRESS NOTES
[facility] [address], [address]  [place] [address]  Dept: [phone]  Dept Fax:[phone]    Dasha Beltran is a 55 y.o. female who presents today for her medical conditions/complaints as notedbelow.        Assessment/Plan:     Assessment & Plan  1. Weight management.  - Her Body Mass Index (BMI) is currently within the healthy range, adjusted for her height and weight.  - The potential side effects of topiramate, including tingling sensations in the fingers and an increased risk of kidney stones, were discussed. She was advised to maintain adequate hydration to mitigate these risks. The possibility of brain fog and altered taste perception with topiramate was also discussed.  - A referral to [facility] will be initiated. The dosage of topiramate will be increased to 50 mg, while the Lomaira dosage will be reduced to 4 mg. She was advised to continue working with [facility] on maintaining a healthy diet and regular exercise.  - She will continue taking half a tablet of Lomaira until the supply is exhausted. A prescription for topiramate 50 mg will be provided when needed.    2. Cervical arthritis.  - The pathophysiology of her condition was explained in detail.  - An x-ray of the cervical spine will be ordered.  - She was advised to inform us if her symptoms worsen or if she wishes to pursue further treatment options.  - The potential for pain management interventions, including steroid injections and nerve ablation, was discussed.    3. Health maintenance.  - She had a mammogram, which was negative.  - She had blood work done twice last year, including diabetes screening, which was under the radar. Her kidney function was normal. She had a cholesterol check after May 1st, and it was normal.  - She had a Pap test last year.  - A kidney function test will be ordered for this May.      Assessment & Plan  Well adult exam     History of gestational diabetes    Orders:

## 2025-04-17 ENCOUNTER — RESULTS FOLLOW-UP (OUTPATIENT)
Dept: FAMILY MEDICINE CLINIC | Age: 55
End: 2025-04-17

## 2025-05-20 ENCOUNTER — OFFICE VISIT (OUTPATIENT)
Dept: DIABETES SERVICES | Age: 55
End: 2025-05-20
Payer: COMMERCIAL

## 2025-05-20 VITALS
SYSTOLIC BLOOD PRESSURE: 114 MMHG | HEIGHT: 65 IN | WEIGHT: 143 LBS | OXYGEN SATURATION: 98 % | HEART RATE: 96 BPM | DIASTOLIC BLOOD PRESSURE: 86 MMHG | BODY MASS INDEX: 23.82 KG/M2

## 2025-05-20 DIAGNOSIS — Z86.39 HISTORY OF OBESITY: ICD-10-CM

## 2025-05-20 DIAGNOSIS — R73.01 IMPAIRED FASTING BLOOD SUGAR: ICD-10-CM

## 2025-05-20 DIAGNOSIS — Z86.32 HISTORY OF GESTATIONAL DIABETES: ICD-10-CM

## 2025-05-20 PROCEDURE — 99204 OFFICE O/P NEW MOD 45 MIN: CPT | Performed by: NURSE PRACTITIONER

## 2025-05-20 RX ORDER — MULTIVIT-MIN/IRON/FOLIC ACID/K 18-600-40
2000 CAPSULE ORAL DAILY
COMMUNITY

## 2025-05-20 RX ORDER — PHENTERMINE HYDROCHLORIDE 8 MG/1
8 TABLET ORAL DAILY
Qty: 30 TABLET | Refills: 0 | Status: SHIPPED | OUTPATIENT
Start: 2025-05-20 | End: 2025-06-19

## 2025-05-20 RX ORDER — TOPIRAMATE SPINKLE 25 MG/1
25 CAPSULE ORAL DAILY
Qty: 60 CAPSULE | Refills: 0 | Status: SHIPPED | OUTPATIENT
Start: 2025-05-20

## 2025-05-20 ASSESSMENT — ENCOUNTER SYMPTOMS
SHORTNESS OF BREATH: 0
RESPIRATORY NEGATIVE: 1

## 2025-05-20 NOTE — PROGRESS NOTES
Patient Care Team:  Virginia Chen MD as PCP - General (Family Medicine)  Virginia Chen MD as PCP - Empaneled Provider        History of Present Illness  The patient presents for evaluation and management of weight loss.    The patient was previously under the care of the weight loss clinic in [facility], and topiramate/phentermine for weight management, both of which were effective. However, Dr. Chen recommended discontinuation of Lomaira (phentermine) due to its potential to elevate blood pressure, despite its efficacy in their weight loss regimen. The patient expresses a desire to continue Lomaira until achieving their target weight of 135 pounds. They report no palpitations, and an electrocardiogram (EKG) performed at [facility] showed normal results.    The patient has not tried any other weight loss medications and has not been informed about weekly injectable treatments. They express apprehension regarding injections due to previous negative experiences. They are considering resuming Lomaira 8 mg and are open to alternative treatments. They are curious about the potential benefits of naltrexone and bupropion (Wellbutrin). Additionally, they are interested in exploring the use of continuous glucose monitors.    The patient has noticed an increase in appetite since reducing their Lomaira dosage, which they manage with a daily caloric intake of 1500 calories, up from 1200 calories when on the full dose of Lomaira. They maintain a regular exercise regimen, including power walking, treadmill use, and rowing machine workouts. They are committed to avoiding weight gain and do not wish to rely solely on pharmacotherapy for weight management.    The patient has been off Lomaira since Sunday and has not experienced any adverse effects from either medication. They were instructed to reduce their Lomaira dosage to half a pill, but this did not yield any noticeable effects. They were also advised to

## 2025-06-16 RX ORDER — PHENTERMINE HYDROCHLORIDE 8 MG/1
8 TABLET ORAL EVERY OTHER DAY
Qty: 60 TABLET | Refills: 0 | Status: SHIPPED | OUTPATIENT
Start: 2025-06-16 | End: 2025-08-15

## 2025-06-16 NOTE — TELEPHONE ENCOUNTER
Writer called and spoke with patient. Patient states that she does not weigh herself outside of office visits. Her last weight was 143 lbs at last OV on 5/20/25.     Patient is agree able to start reducing to every other day with next script (patient currently only has 3 tabs left).    Last Appt:  5/20/2025  Next Appt:  9/2/2025

## 2025-06-16 NOTE — TELEPHONE ENCOUNTER
Please check with pt to see what her current weight is. Any side effects with Lomaira? We will need to start working on weaning this medication. Lets have her decrease to every other day.

## 2025-06-16 NOTE — TELEPHONE ENCOUNTER
Script sent   Controlled Substance Monitoring:    Acute and Chronic Pain Monitoring:   RX Monitoring Periodic Controlled Substance Monitoring   6/16/2025  11:25 AM No signs of potential drug abuse or diversion identified.